# Patient Record
Sex: FEMALE | Race: WHITE | NOT HISPANIC OR LATINO
[De-identification: names, ages, dates, MRNs, and addresses within clinical notes are randomized per-mention and may not be internally consistent; named-entity substitution may affect disease eponyms.]

---

## 2022-02-14 ENCOUNTER — APPOINTMENT (OUTPATIENT)
Dept: GYNECOLOGIC ONCOLOGY | Facility: CLINIC | Age: 32
End: 2022-02-14
Payer: COMMERCIAL

## 2022-02-14 PROBLEM — Z00.00 ENCOUNTER FOR PREVENTIVE HEALTH EXAMINATION: Status: ACTIVE | Noted: 2022-02-14

## 2022-03-02 ENCOUNTER — NON-APPOINTMENT (OUTPATIENT)
Age: 32
End: 2022-03-02

## 2022-06-03 ENCOUNTER — TRANSCRIPTION ENCOUNTER (OUTPATIENT)
Age: 32
End: 2022-06-03

## 2022-08-03 ENCOUNTER — APPOINTMENT (OUTPATIENT)
Dept: GYNECOLOGIC ONCOLOGY | Facility: CLINIC | Age: 32
End: 2022-08-03

## 2022-08-05 ENCOUNTER — APPOINTMENT (OUTPATIENT)
Dept: GYNECOLOGIC ONCOLOGY | Facility: CLINIC | Age: 32
End: 2022-08-05

## 2022-08-09 NOTE — HISTORY OF PRESENT ILLNESS
[FreeTextEntry1] : 30yo P1 w/ ovarian cyst . Televisit today for results review and to finalize surgical booking.\par \par No complaints since last visit. Lab results reviewed with patient in detail.\par \par Labs 8/3/22: (reviewed with patient)\par ca125 =  11\par cea = 0.37\par ca19-9 = 32

## 2022-08-09 NOTE — DISCUSSION/SUMMARY
[Reviewed Clinical Lab Test(s)] : Results of clinical tests were reviewed. [Discuss Alternatives/Risks/Benefits w/Patient] : All alternatives, risks, and benefits were discussed with the patient/family and all questions were answered.  Patient expressed good understanding and appreciates the importance of follow up as recommended. [Visit Time ___ Minutes] : [unfilled] minutes [FreeTextEntry1] : 32yo w/ complex right ovarian cyst, diagnosed during pregnancy and persistent postpartum. For surgical mgmt.\par -entire televisit spent counseling patient and coordinating care. I reviewed again benign vs pre-cancer vs cancer etiologies of ovarian cysts. I Reviewed her normal tumor markers and reviewed characteristics of cyst on recent sono and prior MRI. 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 I reviewed the natural hx of borderline tumors and that surgical mgmt is recommended with either ovarian cystectomy vs salpingoopherectomy. I recommended to patient that I would attempt cystectomy and use frozen section at time of surgery to determine diagnosis and if further surgery is needed. I explained that if borderline tumor and cystectomy is performed, then only additional procedures will be peritoneal biopsies and omentum biopsy. I explained if cancer is found then entire affected ovary would be removed and fertility sparing surgery would be performed. This includes, lymph node dissection, omentectomy, peritoneal biopsies, D&C to sample uterine lining. all questions answered and patient expressed understanding.\par -Surgical booking: Robotic right ovarian cystectomy, possible RSO, possible fertility sparing staging\par -ERAS, PFTs, labs, covid testing\par -the above diagnosis, nature of treatment, procedure, options, benefits, and risks were reviewed. I explained in detail the possible complications including but not limited to bleeding, transfusion, transfusion complications, infection, injury to internal organs such as injury to gastrointestinal or urinary tract,prolonged catherization, intestinal or urinary tract obstruction, vascular injury, wound complications, venous thrombosis, pulmonary embolus, sepsis, pain, possible re-operation to correct complications, and possible abandoning or modifying the procedure due to inoperative findings was discussed. All questions were answered. Patient verbally indicated that she understood the nature of treatment, indications, options, benefits, and risks. Patient elected and consented to the procedure. Pt was informed that there was no guarantee for outcome or cure, and that additional therapy may be indicated depending on the operative findings. \par -f/u post-op. \par

## 2022-09-07 ENCOUNTER — TRANSCRIPTION ENCOUNTER (OUTPATIENT)
Age: 32
End: 2022-09-07

## 2022-09-08 ENCOUNTER — RESULT REVIEW (OUTPATIENT)
Age: 32
End: 2022-09-08

## 2022-09-08 ENCOUNTER — APPOINTMENT (OUTPATIENT)
Dept: GYNECOLOGIC ONCOLOGY | Facility: HOSPITAL | Age: 32
End: 2022-09-08

## 2022-09-09 RX ORDER — TRAMADOL HYDROCHLORIDE 50 MG/1
50 TABLET, COATED ORAL EVERY 6 HOURS
Qty: 15 | Refills: 0 | Status: ACTIVE | COMMUNITY
Start: 2022-09-09 | End: 1900-01-01

## 2022-09-28 ENCOUNTER — APPOINTMENT (OUTPATIENT)
Dept: GYNECOLOGIC ONCOLOGY | Facility: CLINIC | Age: 32
End: 2022-09-28

## 2022-10-03 ENCOUNTER — APPOINTMENT (OUTPATIENT)
Dept: GYNECOLOGIC ONCOLOGY | Facility: CLINIC | Age: 32
End: 2022-10-03

## 2022-10-03 DIAGNOSIS — N83.291 OTHER OVARIAN CYST, RIGHT SIDE: ICD-10-CM

## 2022-10-06 NOTE — HISTORY OF PRESENT ILLNESS
[Home] : at home, [unfilled] , at the time of the visit. [Other Location: e.g. Home (Enter Location, City,State)___] : at [unfilled] [Verbal consent obtained from patient] : the patient, [unfilled] [FreeTextEntry1] : 31yo P1 televisit for initial post-op check\par \par Dx: Right ovarin struma ovarii, non-malignant\par Tx: Robotic right ovarian cystectomy  9/8/22\par Adj tx: none\par \par Since surgery has been doing well. Reports gas pain immediately after surgery. Has had good pain control. Has tolerated all light activity. No n/v/fever. Reports normal urination and BMs. Reports abdominal incisions healing well.\par \par \par Path results reviewed\par FINAL CYTOLOGIC DIAGNOSIS\par Pelvic washings:\par NEGATIVE FOR MALIGNANCY\par Mesothelial cells and scattered histiocytes present\par \par FINAL PATHOLOGIC DIAGNOSIS\par A. Ovary, Right, Cystectomy:\par - Serous cystadenoma\par Melissa Diaz MD\par 09/09/22 - 1230 [address]\par \par ADDENDUM\par Addendum #1              Entered: 09/28/\par There is a 1.5cm solid-cystic area which are positive for TTF-1 and PAX-8 consisitent with\par Struma ovarii; Negative for malignancy.\par

## 2022-10-06 NOTE — DISCUSSION/SUMMARY
[Reviewed Clinical Lab Test(s)] : Results of clinical tests were reviewed. [Discuss Alternatives/Risks/Benefits w/Patient] : All alternatives, risks, and benefits were discussed with the patient/family and all questions were answered.  Patient expressed good understanding and appreciates the importance of follow up as recommended. [Visit Time ___ Minutes] : [unfilled] minutes [FreeTextEntry1] : 33yo P1 w/ right ovarian struma ovarri , non-malignant, s/p cystectomy. Recovering well.\par -entire televisit spent counseling patient and coordinating care.  I reviewed in detail the pathology report and the diagnosis of struma ovarii in this case.  I explained to patient that she has a nonmalignant version of this tumor and that since it is non-malignant, I do not recommend  oophorectomy in this case or surveillance with serial ultrasounds. Also, I would not do surveillance with tumor markers since they were all normal pre-op. I did recommend baseline thyroid function testing to ensure no abnormalities there and if any abnormal findings then I would refer her to endocrinology. all questions answered and patient expressed understanding.\par -TFTs ordered. nurse to call pt with results. If abnormal then referral to endocrinology will be given\par -pt to f/u in person in 6wks  and then 6 month follow up/PRN thereafter. Will consider pelvic sono at 6 months from surgery to have new baseline.\par -pain/fever/bleeding precautions given\par \par

## 2022-10-14 ENCOUNTER — NON-APPOINTMENT (OUTPATIENT)
Age: 32
End: 2022-10-14

## 2022-11-02 ENCOUNTER — APPOINTMENT (OUTPATIENT)
Dept: GYNECOLOGIC ONCOLOGY | Facility: CLINIC | Age: 32
End: 2022-11-02

## 2022-11-02 DIAGNOSIS — D27.0 BENIGN NEOPLASM OF RIGHT OVARY: ICD-10-CM
